# Patient Record
Sex: MALE | Race: WHITE | Employment: FULL TIME | URBAN - METROPOLITAN AREA
[De-identification: names, ages, dates, MRNs, and addresses within clinical notes are randomized per-mention and may not be internally consistent; named-entity substitution may affect disease eponyms.]

---

## 2024-11-14 ENCOUNTER — OFFICE VISIT (OUTPATIENT)
Facility: CLINIC | Age: 44
End: 2024-11-14

## 2024-11-14 VITALS
HEART RATE: 66 BPM | RESPIRATION RATE: 18 BRPM | BODY MASS INDEX: 24.64 KG/M2 | HEIGHT: 67 IN | OXYGEN SATURATION: 97 % | TEMPERATURE: 97.7 F | SYSTOLIC BLOOD PRESSURE: 133 MMHG | DIASTOLIC BLOOD PRESSURE: 85 MMHG | WEIGHT: 157 LBS

## 2024-11-14 DIAGNOSIS — Z00.00 HEALTHCARE MAINTENANCE: ICD-10-CM

## 2024-11-14 DIAGNOSIS — F10.11 HISTORY OF ALCOHOL ABUSE: ICD-10-CM

## 2024-11-14 DIAGNOSIS — F43.10 PTSD (POST-TRAUMATIC STRESS DISORDER): ICD-10-CM

## 2024-11-14 DIAGNOSIS — F41.9 ANXIETY: ICD-10-CM

## 2024-11-14 DIAGNOSIS — G47.00 INSOMNIA, UNSPECIFIED TYPE: ICD-10-CM

## 2024-11-14 DIAGNOSIS — F19.11 HX OF SUBSTANCE ABUSE (HCC): Primary | ICD-10-CM

## 2024-11-14 DIAGNOSIS — F33.1 MODERATE EPISODE OF RECURRENT MAJOR DEPRESSIVE DISORDER (HCC): ICD-10-CM

## 2024-11-14 DIAGNOSIS — L40.9 PSORIASIS: ICD-10-CM

## 2024-11-14 LAB
ANION GAP SERPL CALC-SCNC: 0 MMOL/L (ref 2–12)
BASOPHILS # BLD: 0.1 K/UL (ref 0–0.1)
BASOPHILS NFR BLD: 1 % (ref 0–1)
BUN SERPL-MCNC: 11 MG/DL (ref 6–20)
BUN/CREAT SERPL: 15 (ref 12–20)
CALCIUM SERPL-MCNC: 9.6 MG/DL (ref 8.5–10.1)
CHLORIDE SERPL-SCNC: 102 MMOL/L (ref 97–108)
CHOLEST SERPL-MCNC: 221 MG/DL
CO2 SERPL-SCNC: 35 MMOL/L (ref 21–32)
CREAT SERPL-MCNC: 0.75 MG/DL (ref 0.7–1.3)
DIFFERENTIAL METHOD BLD: ABNORMAL
EOSINOPHIL # BLD: 1 K/UL (ref 0–0.4)
EOSINOPHIL NFR BLD: 11 % (ref 0–7)
ERYTHROCYTE [DISTWIDTH] IN BLOOD BY AUTOMATED COUNT: 13.2 % (ref 11.5–14.5)
EST. AVERAGE GLUCOSE BLD GHB EST-MCNC: 103 MG/DL
GLUCOSE SERPL-MCNC: 92 MG/DL (ref 65–100)
HBA1C MFR BLD: 5.2 % (ref 4–5.6)
HCT VFR BLD AUTO: 40.9 % (ref 36.6–50.3)
HDLC SERPL-MCNC: 59 MG/DL
HDLC SERPL: 3.7 (ref 0–5)
HGB BLD-MCNC: 13.6 G/DL (ref 12.1–17)
IMM GRANULOCYTES # BLD AUTO: 0 K/UL (ref 0–0.04)
IMM GRANULOCYTES NFR BLD AUTO: 0 % (ref 0–0.5)
LDLC SERPL CALC-MCNC: 97.2 MG/DL (ref 0–100)
LYMPHOCYTES # BLD: 3.2 K/UL (ref 0.8–3.5)
LYMPHOCYTES NFR BLD: 35 % (ref 12–49)
MCH RBC QN AUTO: 30.6 PG (ref 26–34)
MCHC RBC AUTO-ENTMCNC: 33.3 G/DL (ref 30–36.5)
MCV RBC AUTO: 92.1 FL (ref 80–99)
MONOCYTES # BLD: 0.5 K/UL (ref 0–1)
MONOCYTES NFR BLD: 5 % (ref 5–13)
NEUTS SEG # BLD: 4.4 K/UL (ref 1.8–8)
NEUTS SEG NFR BLD: 48 % (ref 32–75)
NRBC # BLD: 0 K/UL (ref 0–0.01)
NRBC BLD-RTO: 0 PER 100 WBC
PLATELET # BLD AUTO: 223 K/UL (ref 150–400)
PMV BLD AUTO: 10.7 FL (ref 8.9–12.9)
POTASSIUM SERPL-SCNC: 4.3 MMOL/L (ref 3.5–5.1)
RBC # BLD AUTO: 4.44 M/UL (ref 4.1–5.7)
RBC MORPH BLD: ABNORMAL
SODIUM SERPL-SCNC: 137 MMOL/L (ref 136–145)
TRIGL SERPL-MCNC: 324 MG/DL
VLDLC SERPL CALC-MCNC: 64.8 MG/DL
WBC # BLD AUTO: 9.2 K/UL (ref 4.1–11.1)

## 2024-11-14 RX ORDER — ESCITALOPRAM OXALATE 20 MG/1
20 TABLET ORAL DAILY
Qty: 30 TABLET | Refills: 0 | Status: SHIPPED | OUTPATIENT
Start: 2024-11-14

## 2024-11-14 RX ORDER — HYDROXYZINE HYDROCHLORIDE 25 MG/1
25 TABLET, FILM COATED ORAL NIGHTLY
Qty: 30 TABLET | Refills: 0 | Status: SHIPPED | OUTPATIENT
Start: 2024-11-14 | End: 2024-12-14

## 2024-11-14 SDOH — ECONOMIC STABILITY: FOOD INSECURITY: WITHIN THE PAST 12 MONTHS, THE FOOD YOU BOUGHT JUST DIDN'T LAST AND YOU DIDN'T HAVE MONEY TO GET MORE.: NEVER TRUE

## 2024-11-14 SDOH — ECONOMIC STABILITY: INCOME INSECURITY: HOW HARD IS IT FOR YOU TO PAY FOR THE VERY BASICS LIKE FOOD, HOUSING, MEDICAL CARE, AND HEATING?: NOT HARD AT ALL

## 2024-11-14 SDOH — ECONOMIC STABILITY: FOOD INSECURITY: WITHIN THE PAST 12 MONTHS, YOU WORRIED THAT YOUR FOOD WOULD RUN OUT BEFORE YOU GOT MONEY TO BUY MORE.: NEVER TRUE

## 2024-11-14 ASSESSMENT — ANXIETY QUESTIONNAIRES
2. NOT BEING ABLE TO STOP OR CONTROL WORRYING: NEARLY EVERY DAY
5. BEING SO RESTLESS THAT IT IS HARD TO SIT STILL: NEARLY EVERY DAY
IF YOU CHECKED OFF ANY PROBLEMS ON THIS QUESTIONNAIRE, HOW DIFFICULT HAVE THESE PROBLEMS MADE IT FOR YOU TO DO YOUR WORK, TAKE CARE OF THINGS AT HOME, OR GET ALONG WITH OTHER PEOPLE: SOMEWHAT DIFFICULT
7. FEELING AFRAID AS IF SOMETHING AWFUL MIGHT HAPPEN: NOT AT ALL
3. WORRYING TOO MUCH ABOUT DIFFERENT THINGS: NEARLY EVERY DAY
4. TROUBLE RELAXING: NEARLY EVERY DAY
GAD7 TOTAL SCORE: 18
6. BECOMING EASILY ANNOYED OR IRRITABLE: NEARLY EVERY DAY
1. FEELING NERVOUS, ANXIOUS, OR ON EDGE: NEARLY EVERY DAY

## 2024-11-14 ASSESSMENT — PATIENT HEALTH QUESTIONNAIRE - PHQ9
7. TROUBLE CONCENTRATING ON THINGS, SUCH AS READING THE NEWSPAPER OR WATCHING TELEVISION: NOT AT ALL
10. IF YOU CHECKED OFF ANY PROBLEMS, HOW DIFFICULT HAVE THESE PROBLEMS MADE IT FOR YOU TO DO YOUR WORK, TAKE CARE OF THINGS AT HOME, OR GET ALONG WITH OTHER PEOPLE: SOMEWHAT DIFFICULT
SUM OF ALL RESPONSES TO PHQ QUESTIONS 1-9: 16
5. POOR APPETITE OR OVEREATING: MORE THAN HALF THE DAYS
4. FEELING TIRED OR HAVING LITTLE ENERGY: NEARLY EVERY DAY
6. FEELING BAD ABOUT YOURSELF - OR THAT YOU ARE A FAILURE OR HAVE LET YOURSELF OR YOUR FAMILY DOWN: NEARLY EVERY DAY
3. TROUBLE FALLING OR STAYING ASLEEP: NEARLY EVERY DAY
9. THOUGHTS THAT YOU WOULD BE BETTER OFF DEAD, OR OF HURTING YOURSELF: NOT AT ALL
2. FEELING DOWN, DEPRESSED OR HOPELESS: MORE THAN HALF THE DAYS
SUM OF ALL RESPONSES TO PHQ QUESTIONS 1-9: 16
SUM OF ALL RESPONSES TO PHQ QUESTIONS 1-9: 16
8. MOVING OR SPEAKING SO SLOWLY THAT OTHER PEOPLE COULD HAVE NOTICED. OR THE OPPOSITE, BEING SO FIGETY OR RESTLESS THAT YOU HAVE BEEN MOVING AROUND A LOT MORE THAN USUAL: MORE THAN HALF THE DAYS
1. LITTLE INTEREST OR PLEASURE IN DOING THINGS: SEVERAL DAYS
SUM OF ALL RESPONSES TO PHQ QUESTIONS 1-9: 16
SUM OF ALL RESPONSES TO PHQ9 QUESTIONS 1 & 2: 3

## 2024-11-14 ASSESSMENT — ENCOUNTER SYMPTOMS
GASTROINTESTINAL NEGATIVE: 1
EYES NEGATIVE: 1
ALLERGIC/IMMUNOLOGIC NEGATIVE: 1
RESPIRATORY NEGATIVE: 1

## 2024-11-14 NOTE — PROGRESS NOTES
Family Medicine Initial Office Visit  Patient: Mamadou Engel  1980, 44 y.o., male  Encounter Date: 2024      CHIEF COMPLAINT  Chief Complaint   Patient presents with    New Patient     Mamadou Engel is an 44 y.o. male who presents as a new patient to [facility] to establish care.   Concerns: really bad psoriasis (onset in [employer]). Took Stelera - caused septic after injections [employer] - not sure timeline.   2. Bipolar - his wife wants to ck (she works here in cardiology) Night terrors.       SUBJECTIVE  Mamadou Enegl is a very pleasant 44-year-old male who presents to establish care.    Has not seen primary care physician in a couple of years.    Medical history significant for depression, anxiety, PTSD, psoriasis, history of substance abuse, history of alcohol abuse    Social Hx:  with kids.  Denies current alcohol intake, denies smoking or illicit drugs.      Alcohol use disorder/substance abuse disorder  Was drinking 415 years, is sober since 9 months.  Was in [place] and rehab clinic.  Developed opioid dependence 20 years ago when he had a kidney stone.  Use to take Percocet.  Has overcome opioid dependence.      Anxiety/depression/PTSD/night terrors  Was [employer], is out since   Is suffering from PTSD and night terrors.  Was diagnosed with anxiety and depression many years ago.  Has seen 8-9 therapists over the last few years, had bad experience with most of them  Has last seen a therapist in [place] in 2023  Patient himself tells me that he was adopted, does not know much about his biologic parents.  His adoptive parents  back-to-back 4 years ago.  His godfather shot himself and 3 friends had committed suicide at the same time.  Patient himself endorses night terrors couple of times per week.  Has a hard time falling asleep and staying asleep, sleeps about 3 hours per night.  Feels very irritable, short tempered, feels bad when he yells with his

## 2024-11-14 NOTE — PATIENT INSTRUCTIONS
This is what we discussed today:    Complete blood work after 8 hours of fasting.  I have placed a referral for you to see an skin specialist for your psoriasis  I have placed referral for you to see a psychiatrist for further evaluation for bipolar disorder.  Please check in with your insurance for a list of therapists that they cover.  Try Hydroxyzine 1 pill at night for sleep. If ineffective take 2 pills at night time      Thank you for taking an active part in your health management!       The 24/7 [facility] Health Crisis line ([phone]) is available to assist with mental health emergencies occurring in the [facility]. If you, or someone you know, is having thoughts of suicide, feelings of hopelessness or experiencing any other psychiatric emergency, please call! They can assist in discussing ways to stay safe and/or can link individuals to outpatient treatment, crisis stabilization programs, or psychiatric hospitalization as needed.   All crisis calls are free. If you have a mental health emergency, please call [phone].   Regular client appointments and intakes for new clients are being conducted by telephone or through tele-health options. Individuals wishing to begin outpatient counseling services through [facility] can the Intake number at [phone].  -----------     Crisis Response and Stabilization Team (CReST) Referral Line [phone]  CReST is a resource for children ages 5 - 18 or adults ages 18 & older who are in crisis and need help avoiding psychiatric hospitalization.  With quick response and assistance connecting to on-going services, CReST works to reduce the cycles of crises and prevent the need for more intense care.     -----------     [facility] CRISIS & REFERRAL LINE - [phone]  REACH is a program to support individuals with developmental disabilities who are at risk of crisis due to challenging behavioral health needs which are negatively affecting their quality of life.

## 2024-11-14 NOTE — PROGRESS NOTES
Chief Complaint   Patient presents with    New Patient     Mamadou Engel is an 44 y.o. male who presents as a new patient to [facility] to establish care.   Concerns: really bad psoriasis (onset in [employer]). Took Stelera - caused septic after injections [employer] - not sure timeline.   2. Bipolar - his wife wants to ck (she works here in cardiology) Night terrors.     \"Have you been to the ER, urgent care clinic since your last visit?  Hospitalized since your last visit?\"    YES - When: approximately 1 months ago.  Where and Why: Urgent care sent to ER (see note) bad strep.    “Have you seen or consulted any other health care providers outside of [facility] since your last visit?”    NO            Click Here for Release of Records Request

## 2024-11-14 NOTE — ASSESSMENT & PLAN NOTE
Extensive psoriatic patches on torso, upper or lower extremities.  Tells me that he developed sepsis under Stelara treatment.  Will refer to dermatology    Orders:    Sheridan Howard MD, [place] ([facility])

## 2024-11-14 NOTE — ASSESSMENT & PLAN NOTE
On SSRI, Lexapro.  Will refer to psychiatrist.  Discussed at length importance of having therapist    Orders:    Tima Genao MD, Psychiatry, [place]

## 2024-11-15 LAB — TSH SERPL DL<=0.05 MIU/L-ACNC: 1 UIU/ML (ref 0.45–4.5)

## 2025-01-23 ENCOUNTER — OFFICE VISIT (OUTPATIENT)
Facility: CLINIC | Age: 45
End: 2025-01-23
Payer: COMMERCIAL

## 2025-01-23 VITALS
WEIGHT: 160.3 LBS | BODY MASS INDEX: 25.16 KG/M2 | TEMPERATURE: 98 F | DIASTOLIC BLOOD PRESSURE: 85 MMHG | HEIGHT: 67 IN | HEART RATE: 82 BPM | OXYGEN SATURATION: 95 % | SYSTOLIC BLOOD PRESSURE: 138 MMHG

## 2025-01-23 DIAGNOSIS — F33.1 MAJOR DEPRESSIVE DISORDER, RECURRENT, MODERATE (HCC): Primary | ICD-10-CM

## 2025-01-23 DIAGNOSIS — F41.9 ANXIETY: ICD-10-CM

## 2025-01-23 DIAGNOSIS — F10.11 HISTORY OF ALCOHOL ABUSE: ICD-10-CM

## 2025-01-23 PROBLEM — F19.11 HX OF SUBSTANCE ABUSE (HCC): Status: ACTIVE | Noted: 2025-01-23

## 2025-01-23 PROCEDURE — 99214 OFFICE O/P EST MOD 30 MIN: CPT

## 2025-01-23 RX ORDER — VENLAFAXINE HYDROCHLORIDE 37.5 MG/1
37.5 CAPSULE, EXTENDED RELEASE ORAL DAILY
Qty: 30 CAPSULE | Refills: 3 | Status: SHIPPED | OUTPATIENT
Start: 2025-01-23

## 2025-01-23 SDOH — ECONOMIC STABILITY: FOOD INSECURITY: WITHIN THE PAST 12 MONTHS, THE FOOD YOU BOUGHT JUST DIDN'T LAST AND YOU DIDN'T HAVE MONEY TO GET MORE.: NEVER TRUE

## 2025-01-23 SDOH — ECONOMIC STABILITY: FOOD INSECURITY: WITHIN THE PAST 12 MONTHS, YOU WORRIED THAT YOUR FOOD WOULD RUN OUT BEFORE YOU GOT MONEY TO BUY MORE.: NEVER TRUE

## 2025-01-23 ASSESSMENT — PATIENT HEALTH QUESTIONNAIRE - PHQ9
6. FEELING BAD ABOUT YOURSELF - OR THAT YOU ARE A FAILURE OR HAVE LET YOURSELF OR YOUR FAMILY DOWN: MORE THAN HALF THE DAYS
SUM OF ALL RESPONSES TO PHQ QUESTIONS 1-9: 10
10. IF YOU CHECKED OFF ANY PROBLEMS, HOW DIFFICULT HAVE THESE PROBLEMS MADE IT FOR YOU TO DO YOUR WORK, TAKE CARE OF THINGS AT HOME, OR GET ALONG WITH OTHER PEOPLE: SOMEWHAT DIFFICULT
1. LITTLE INTEREST OR PLEASURE IN DOING THINGS: SEVERAL DAYS
SUM OF ALL RESPONSES TO PHQ QUESTIONS 1-9: 10
SUM OF ALL RESPONSES TO PHQ9 QUESTIONS 1 & 2: 2
SUM OF ALL RESPONSES TO PHQ QUESTIONS 1-9: 10
2. FEELING DOWN, DEPRESSED OR HOPELESS: SEVERAL DAYS
3. TROUBLE FALLING OR STAYING ASLEEP: MORE THAN HALF THE DAYS
7. TROUBLE CONCENTRATING ON THINGS, SUCH AS READING THE NEWSPAPER OR WATCHING TELEVISION: NOT AT ALL
5. POOR APPETITE OR OVEREATING: NOT AT ALL
8. MOVING OR SPEAKING SO SLOWLY THAT OTHER PEOPLE COULD HAVE NOTICED. OR THE OPPOSITE, BEING SO FIGETY OR RESTLESS THAT YOU HAVE BEEN MOVING AROUND A LOT MORE THAN USUAL: SEVERAL DAYS
SUM OF ALL RESPONSES TO PHQ QUESTIONS 1-9: 10
9. THOUGHTS THAT YOU WOULD BE BETTER OFF DEAD, OR OF HURTING YOURSELF: NOT AT ALL
4. FEELING TIRED OR HAVING LITTLE ENERGY: NEARLY EVERY DAY

## 2025-01-23 NOTE — PROGRESS NOTES
Chief Complaint   Patient presents with    Anxiety     Mamadou Engel is a 44 y.o. male who presents for a follow up on anxiety and PTSD.    Patient has been on this dose since May. He reports he feels like this medication is not effective anymore.     \"Have you been to the ER, urgent care clinic since your last visit?  Hospitalized since your last visit?\"    NO    “Have you seen or consulted any other health care providers outside of [facility] since your last visit?”    NO            Click Here for Release of Records Request    
He asked for psychiatrists in [place] and I have printed off a list for them. Will proceed with lexapro wash out over 2-3 weeks, then start effexro 37.5 mg qd to see if there is any improvement in mood. Follow up in 4 weeks.    Orders:    venlafaxine (EFFEXOR XR) 37.5 MG extended release capsule; Take 1 capsule by mouth daily    Anxiety   Chronic, not at goal (unstable). Has been on Prozac > Zoloft > Lexapro 20 mg. Zoloft and lexapro helped with mood/anxiety for a bit (4-5 months) but then stopped working. Hydroxyzine prn made him feel too uptight per patient. Will wean off lexapro and start effexor as scheduled above. Follow up in 4 weeks.    Orders:    venlafaxine (EFFEXOR XR) 37.5 MG extended release capsule; Take 1 capsule by mouth daily    History of alcohol abuse   Chronic, not at goal (unstable), patient has relapse in drinking alcohol to help with his mood symptoms and anxiety. Today, we introduced naltrexone as a treatment option. But it seems he is using more alcohol to help with mood than cravings. With weaning off lexapro and starting effexor we will hold off on starting naltrexone in case there are overlying side effects. CMP from October 2024 checked. Will treat depression and anxiety first and patient has AA programs that he has been a part of in the past.                         Portions of this note may have been populated using smart dictation software and may have \"sounds-like\" errors present.     Pt was counseled on risks, benefits and alternatives of treatment options. All questions were asked and answered and the patient was agreeable with the treatment plan as outlined.

## 2025-01-23 NOTE — ASSESSMENT & PLAN NOTE
Chronic, not at goal (unstable). Has been on Prozac > Zoloft > Lexapro 20 mg. Zoloft and lexapro helped with mood for a bit (4-5 months) but then stopped working. He is not in counseling. Reports he has bad experiences with counselors and psychiatry. Since he has failed SSRIs, we've discussed switching him to an SNRI. Although we've also discussed if there is underlying bipolar, he would have to see psychiatry to get started on a mood stabilizer. Patient agreed and will thinking about psychiatry. He asked for psychiatrists in [place] and I have printed off a list for them. Will proceed with lexapro wash out over 2-3 weeks, then start effexro 37.5 mg qd to see if there is any improvement in mood. Follow up in 4 weeks.    Orders:    venlafaxine (EFFEXOR XR) 37.5 MG extended release capsule; Take 1 capsule by mouth daily

## 2025-02-10 ENCOUNTER — TELEPHONE (OUTPATIENT)
Facility: CLINIC | Age: 45
End: 2025-02-10

## 2025-02-10 NOTE — TELEPHONE ENCOUNTER
Pt's wife called in regards to pt's Effexor RX.     Pt is having vivid dreams, fearful to go to sleep.   Pt states he feels SOB, however, wife states he is not.     Pt is off on Lexapro and has been on Effexor for 2 weeks.

## 2025-02-13 ENCOUNTER — CLINICAL DOCUMENTATION (OUTPATIENT)
Facility: CLINIC | Age: 45
End: 2025-02-13

## 2025-02-13 NOTE — TELEPHONE ENCOUNTER
Jennifer Bashir, DO  Mamadou Canoy21 hours ago (2:14 PM)       I've received a message from our staff that you are completely off Lexapro now.      But since being effexor for 2 weeks you have had very bad side effects. I would advise you to stop the Effexor since it has been 2 weeks and you can getting more side effects then benefit from it. We're going to give you a call to check on you, but we have follow up 2/25. Let's stop effexor entirely to reassess your symptoms then.     Dr. Bashir

## 2025-02-14 ENCOUNTER — TELEPHONE (OUTPATIENT)
Facility: CLINIC | Age: 45
End: 2025-02-14

## 2025-02-14 NOTE — TELEPHONE ENCOUNTER
Pts wife is calling about the message that was sent. He has not been able to get into my chart, so I gave her the message but she would like a call back as he is at work. She is on his HIPAA     Amy [phone]

## 2025-02-14 NOTE — TELEPHONE ENCOUNTER
Amy Esme (wife) works upstairs  Advised that pt is not taking effexor anymore but he is still having extreme anxiety symptoms

## 2025-02-19 ENCOUNTER — TELEMEDICINE (OUTPATIENT)
Facility: CLINIC | Age: 45
End: 2025-02-19
Payer: COMMERCIAL

## 2025-02-19 DIAGNOSIS — F43.10 PTSD (POST-TRAUMATIC STRESS DISORDER): ICD-10-CM

## 2025-02-19 DIAGNOSIS — F41.9 ANXIETY: Primary | ICD-10-CM

## 2025-02-19 PROBLEM — F19.11 HX OF SUBSTANCE ABUSE (HCC): Status: RESOLVED | Noted: 2025-01-23 | Resolved: 2025-02-19

## 2025-02-19 PROCEDURE — 99214 OFFICE O/P EST MOD 30 MIN: CPT

## 2025-02-19 RX ORDER — BUSPIRONE HYDROCHLORIDE 5 MG/1
5 TABLET ORAL 2 TIMES DAILY
Qty: 60 TABLET | Refills: 0 | Status: SHIPPED | OUTPATIENT
Start: 2025-02-19 | End: 2025-03-21

## 2025-02-19 NOTE — ASSESSMENT & PLAN NOTE
Chronic, unstable.  SSRIs have worked for a bit, but then stopped working.  Has been on Lexapro, Prozac, Zoloft.  Hydroxyzine made him uptight.  Effexor gave him vivid dreams.  Is currently not on any medications right now.  Will trial BuSpar 5 mg twice daily.  Follow-up in 4 weeks.  Let me know right away if he has any bad side effects we can discontinue the medication and consider another medication.  Does not want to follow with a counselor or therapist as of now    Orders:    busPIRone (BUSPAR) 5 MG tablet; Take 1 tablet by mouth 2 times daily

## 2025-02-19 NOTE — PROGRESS NOTES
Chief Complaint   Patient presents with    Anxiety     Follow up- Effexor is not working well for pt, triggering his PTSD      \"Have you been to the ER, urgent care clinic since your last visit?  Hospitalized since your last visit?\"    NO    “Have you seen or consulted any other health care providers outside of [facility] since your last visit?”    NO            Click Here for Release of Records Request

## 2025-02-19 NOTE — PROGRESS NOTES
Mamadou Engel, was evaluated through a synchronous (real-time) audio-video encounter. The patient (or guardian if applicable) is aware that this is a billable service, which includes applicable co-pays. This Virtual Visit was conducted with patient's (and/or legal guardian's) consent. Patient identification was verified, and a caregiver was present when appropriate.   The patient was located at Home: [id number] [facility] [id number]  Provider was located at Home (Appt Dept State[place]  Confirm you are appropriately licensed, registered, or certified to deliver care in the state where the patient is located as indicated above. If you are not or unsure, please re-schedule the visit: Yes, I confirm.     Mamadou Engel (:  1980) is a Established patient, presenting virtually for evaluation of the following:      Below is the assessment and plan developed based on review of pertinent history, physical exam, labs, studies, and medications.     Assessment & Plan  Anxiety  Chronic, unstable.  SSRIs have worked for a bit, but then stopped working.  Has been on Lexapro, Prozac, Zoloft.  Hydroxyzine made him uptight.  Effexor gave him vivid dreams.  Is currently not on any medications right now.  Will trial BuSpar 5 mg twice daily.  Follow-up in 4 weeks.  Let me know right away if he has any bad side effects we can discontinue the medication and consider another medication.  Does not want to follow with a counselor or therapist as of now.    Orders:    busPIRone (BUSPAR) 5 MG tablet; Take 1 tablet by mouth 2 times daily    PTSD (post-traumatic stress disorder)   Chronic, unstable.  SSRIs have worked for a bit, but then stopped working.  Has been on Lexapro, Prozac, Zoloft.  Hydroxyzine made him uptight.  Effexor gave him vivid dreams.  Is currently not on any medications right now.  Will trial BuSpar 5 mg twice daily.  Follow-up in 4 weeks.  Let me know right away if he has any bad side effects we can

## 2025-03-13 RX ORDER — BUSPIRONE HYDROCHLORIDE 7.5 MG/1
7.5 TABLET ORAL 2 TIMES DAILY
Qty: 60 TABLET | Refills: 0 | Status: SHIPPED | OUTPATIENT
Start: 2025-03-13 | End: 2025-04-12

## 2025-03-28 ENCOUNTER — TELEPHONE (OUTPATIENT)
Facility: CLINIC | Age: 45
End: 2025-03-28

## 2025-03-28 NOTE — TELEPHONE ENCOUNTER
Salma Nixon MD [facility] Clinical Staff  Pls contact patient and pass on info below. Thanks          Previous Messages       ----- Message -----  From: Jennifer Bashir DO  Sent: 3/13/2025  To: Mamadou Engel  Subject: Unread Message Notification      This message is to inform you that the patient has not yet read the following message. (Notification date: March 27, 2025)      Medication update    From  Jennifer Bashir DO To  Mamadou Engel Sent and Delivered  3/13/2025 10:52 AM         I sent in a new prescription of BuSpar 7.5 mg to take twice a day.  Lets make sure you have a follow-up virtually so that we can document this increase in dose and if you are experiencing any side effects on it.  I noticed that we had 1 on 2/25/2025 that was canceled, I will message my  to get this set up.

## 2025-03-28 NOTE — TELEPHONE ENCOUNTER
Called pt, and left a voice message, that he/she is due for their follow up appointment, here at [facility]. Advised pt to call the office back to schedule their appointment.

## 2025-04-16 ENCOUNTER — PATIENT MESSAGE (OUTPATIENT)
Facility: CLINIC | Age: 45
End: 2025-04-16

## 2025-04-17 RX ORDER — BUSPIRONE HYDROCHLORIDE 7.5 MG/1
7.5 TABLET ORAL 2 TIMES DAILY
Qty: 60 TABLET | Refills: 0 | Status: SHIPPED | OUTPATIENT
Start: 2025-04-17 | End: 2025-05-17

## 2025-05-18 ENCOUNTER — NURSE TRIAGE (OUTPATIENT)
Dept: OTHER | Facility: CLINIC | Age: 45
End: 2025-05-18

## 2025-05-18 ENCOUNTER — HOSPITAL ENCOUNTER (EMERGENCY)
Facility: HOSPITAL | Age: 45
Discharge: HOME | End: 2025-05-18
Attending: EMERGENCY MEDICINE
Payer: COMMERCIAL

## 2025-05-18 VITALS
HEART RATE: 97 BPM | SYSTOLIC BLOOD PRESSURE: 146 MMHG | WEIGHT: 155 LBS | DIASTOLIC BLOOD PRESSURE: 105 MMHG | TEMPERATURE: 97.5 F | RESPIRATION RATE: 18 BRPM | HEIGHT: 67 IN | OXYGEN SATURATION: 99 % | BODY MASS INDEX: 24.33 KG/M2

## 2025-05-18 DIAGNOSIS — K59.00 CONSTIPATION, UNSPECIFIED CONSTIPATION TYPE: ICD-10-CM

## 2025-05-18 DIAGNOSIS — K62.5 RECTAL BLEEDING: Primary | ICD-10-CM

## 2025-05-18 DIAGNOSIS — K64.8 INTERNAL HEMORRHOIDS: ICD-10-CM

## 2025-05-18 LAB
ALBUMIN SERPL-MCNC: 4 G/DL (ref 3.5–5)
ALBUMIN/GLOB SERPL: 1.1 (ref 1.1–2.2)
ALP SERPL-CCNC: 86 U/L (ref 45–117)
ALT SERPL-CCNC: 31 U/L (ref 12–78)
ANION GAP SERPL CALC-SCNC: 6 MMOL/L (ref 2–12)
AST SERPL-CCNC: 23 U/L (ref 15–37)
BASOPHILS # BLD: 0.08 K/UL (ref 0–0.1)
BASOPHILS NFR BLD: 0.7 % (ref 0–1)
BILIRUB SERPL-MCNC: 0.8 MG/DL (ref 0.2–1)
BUN SERPL-MCNC: 13 MG/DL (ref 6–20)
BUN/CREAT SERPL: 17 (ref 12–20)
CALCIUM SERPL-MCNC: 9.2 MG/DL (ref 8.5–10.1)
CHLORIDE SERPL-SCNC: 107 MMOL/L (ref 97–108)
CO2 SERPL-SCNC: 25 MMOL/L (ref 21–32)
CREAT SERPL-MCNC: 0.75 MG/DL (ref 0.7–1.3)
DIFFERENTIAL METHOD BLD: ABNORMAL
EOSINOPHIL # BLD: 0.96 K/UL (ref 0–0.4)
EOSINOPHIL NFR BLD: 8 % (ref 0–7)
ERYTHROCYTE [DISTWIDTH] IN BLOOD BY AUTOMATED COUNT: 12.9 % (ref 11.5–14.5)
GLOBULIN SER CALC-MCNC: 3.6 G/DL (ref 2–4)
GLUCOSE SERPL-MCNC: 81 MG/DL (ref 65–100)
HCT VFR BLD AUTO: 42.3 % (ref 36.6–50.3)
HGB BLD-MCNC: 14.4 G/DL (ref 12.1–17)
IMM GRANULOCYTES # BLD AUTO: 0.04 K/UL (ref 0–0.04)
IMM GRANULOCYTES NFR BLD AUTO: 0.3 % (ref 0–0.5)
LYMPHOCYTES # BLD: 2.2 K/UL (ref 0.8–3.5)
LYMPHOCYTES NFR BLD: 18.4 % (ref 12–49)
MCH RBC QN AUTO: 31.6 PG (ref 26–34)
MCHC RBC AUTO-ENTMCNC: 34 G/DL (ref 30–36.5)
MCV RBC AUTO: 92.8 FL (ref 80–99)
MONOCYTES # BLD: 0.79 K/UL (ref 0–1)
MONOCYTES NFR BLD: 6.6 % (ref 5–13)
NEUTS SEG # BLD: 7.91 K/UL (ref 1.8–8)
NEUTS SEG NFR BLD: 66 % (ref 32–75)
NRBC # BLD: 0 K/UL (ref 0–0.01)
NRBC BLD-RTO: 0 PER 100 WBC
PLATELET # BLD AUTO: 200 K/UL (ref 150–400)
PMV BLD AUTO: 9.8 FL (ref 8.9–12.9)
POTASSIUM SERPL-SCNC: 3.7 MMOL/L (ref 3.5–5.1)
PROT SERPL-MCNC: 7.6 G/DL (ref 6.4–8.2)
RBC # BLD AUTO: 4.56 M/UL (ref 4.1–5.7)
SODIUM SERPL-SCNC: 138 MMOL/L (ref 136–145)
WBC # BLD AUTO: 12 K/UL (ref 4.1–11.1)

## 2025-05-18 PROCEDURE — 96374 THER/PROPH/DIAG INJ IV PUSH: CPT

## 2025-05-18 PROCEDURE — 6360000002 HC RX W HCPCS: Performed by: EMERGENCY MEDICINE

## 2025-05-18 PROCEDURE — 94761 N-INVAS EAR/PLS OXIMETRY MLT: CPT

## 2025-05-18 PROCEDURE — 99284 EMERGENCY DEPT VISIT MOD MDM: CPT

## 2025-05-18 PROCEDURE — 80053 COMPREHEN METABOLIC PANEL: CPT

## 2025-05-18 PROCEDURE — 96375 TX/PRO/DX INJ NEW DRUG ADDON: CPT

## 2025-05-18 PROCEDURE — 36415 COLL VENOUS BLD VENIPUNCTURE: CPT

## 2025-05-18 PROCEDURE — 85025 COMPLETE CBC W/AUTO DIFF WBC: CPT

## 2025-05-18 RX ORDER — OXYCODONE AND ACETAMINOPHEN 5; 325 MG/1; MG/1
1 TABLET ORAL EVERY 6 HOURS PRN
Qty: 10 TABLET | Refills: 0 | Status: SHIPPED | OUTPATIENT
Start: 2025-05-18 | End: 2025-05-21

## 2025-05-18 RX ORDER — LIDOCAINE HYDROCHLORIDE 20 MG/ML
JELLY TOPICAL PRN
Status: DISCONTINUED | OUTPATIENT
Start: 2025-05-18 | End: 2025-05-18 | Stop reason: HOSPADM

## 2025-05-18 RX ORDER — KETOROLAC TROMETHAMINE 15 MG/ML
15 INJECTION, SOLUTION INTRAMUSCULAR; INTRAVENOUS
Status: COMPLETED | OUTPATIENT
Start: 2025-05-18 | End: 2025-05-18

## 2025-05-18 RX ORDER — KETOROLAC TROMETHAMINE 10 MG/1
10 TABLET, FILM COATED ORAL EVERY 6 HOURS PRN
Qty: 20 TABLET | Refills: 0 | Status: SHIPPED | OUTPATIENT
Start: 2025-05-18

## 2025-05-18 RX ORDER — PRAMOXINE HYDROCHLORIDE HYDROCORTISONE ACETATE 100; 100 MG/10G; MG/10G
AEROSOL, FOAM TOPICAL
Qty: 1 EACH | Refills: 0 | Status: SHIPPED
Start: 2025-05-18 | End: 2025-05-18

## 2025-05-18 RX ORDER — POLYETHYLENE GLYCOL 3350 17 G/17G
17 POWDER, FOR SOLUTION ORAL DAILY
Status: DISCONTINUED | OUTPATIENT
Start: 2025-05-18 | End: 2025-05-18

## 2025-05-18 RX ORDER — PRAMOXINE HYDROCHLORIDE HYDROCORTISONE ACETATE 100; 100 MG/10G; MG/10G
AEROSOL, FOAM TOPICAL
Qty: 1 EACH | Refills: 0 | Status: SHIPPED | OUTPATIENT
Start: 2025-05-18

## 2025-05-18 RX ORDER — MORPHINE SULFATE 4 MG/ML
4 INJECTION, SOLUTION INTRAMUSCULAR; INTRAVENOUS
Refills: 0 | Status: COMPLETED | OUTPATIENT
Start: 2025-05-18 | End: 2025-05-18

## 2025-05-18 RX ORDER — POLYETHYLENE GLYCOL 3350 17 G/17G
17 POWDER, FOR SOLUTION ORAL DAILY
Qty: 116 G | Refills: 0 | Status: SHIPPED | OUTPATIENT
Start: 2025-05-18 | End: 2025-05-25

## 2025-05-18 RX ADMIN — KETOROLAC TROMETHAMINE 15 MG: 15 INJECTION, SOLUTION INTRAMUSCULAR; INTRAVENOUS at 13:02

## 2025-05-18 RX ADMIN — MORPHINE SULFATE 4 MG: 4 INJECTION INTRAVENOUS at 13:51

## 2025-05-18 ASSESSMENT — PAIN SCALES - GENERAL: PAINLEVEL_OUTOF10: 10

## 2025-05-18 ASSESSMENT — PAIN - FUNCTIONAL ASSESSMENT: PAIN_FUNCTIONAL_ASSESSMENT: 0-10

## 2025-05-18 NOTE — ED TRIAGE NOTES
Pt arrives to ED via POV with c/o internal hemorrhoid pain and started bleeding 3 days ago     Ibuprofen 800 mg taken at 1200 PTA

## 2025-05-18 NOTE — ED PROVIDER NOTES
[facility] EMERGENCY DEPARTMENT  EMERGENCY DEPARTMENT ENCOUNTER      Pt Name: Mamadou Engel  MRN: [id number]  Birthdate 1980  Date of evaluation: 5/18/2025  Provider: Bethany Ventura DO    CHIEF COMPLAINT       Chief Complaint   Patient presents with    Hemorrhoids         HISTORY OF PRESENT ILLNESS   (Location/Symptom, Timing/Onset, Context/Setting, Quality, Duration, Modifying Factors, Severity)  Note limiting factors.   HPI    Patient is a 45-year-old male who presents emergency department with rectal bleeding and rectal pain over the past 3 days.    Review of External Medical Records:     Nursing Notes were reviewed.    REVIEW OF SYSTEMS    (2-9 systems for level 4, 10 or more for level 5)     Review of Systems    Except as noted above the remainder of the review of systems was reviewed and negative.       PAST MEDICAL HISTORY     Past Medical History:   Diagnosis Date    Anxiety     OCD (obsessive compulsive disorder)     PTSD (post-traumatic stress disorder)     from surgery         SURGICAL HISTORY       Past Surgical History:   Procedure Laterality Date    EYE SURGERY      SPINAL FUSION Left 2023         CURRENT MEDICATIONS       Previous Medications    No medications on file       ALLERGIES     Promethazine    FAMILY HISTORY       Family History   Adopted: Yes   Problem Relation Age of Onset    Cancer Father         bile duct          SOCIAL HISTORY       Social History     Socioeconomic History    Marital status:    Tobacco Use    Smoking status: Every Day     Current packs/day: 0.25     Average packs/day: 0.3 packs/day for 28.4 years (7.1 ttl pk-yrs)     Types: Cigarettes     Start date: 1997    Smokeless tobacco: Never   Vaping Use    Vaping status: Never Used   Substance and Sexual Activity    Alcohol use: Yes     Comment: drinking for 2-3 months now    Drug use: Yes     Frequency: 4.0 times per week     Comment: THC and CBD    Sexual activity: Yes     Partners: Female

## 2025-05-19 ENCOUNTER — CARE COORDINATION (OUTPATIENT)
Dept: OTHER | Facility: CLINIC | Age: 45
End: 2025-05-19

## 2025-05-19 NOTE — CARE COORDINATION
Ambulatory Care Coordination Note     5/19/2025 3:28 PM     Patient outreach attempt by this AC today to offer care management services. AC was unable to reach the patient by telephone today;   left voice message requesting a return phone call to this ACM.     ACM: MATHEW CABRERA RN     Care Summary Note: Telephonic outreach attempt to the patient to offer care management services. Left a discreet VM with a request for a return call. Will continue outreach attempts.     PCP/Specialist follow up:       Follow Up:   Plan for next ACM outreach in approximately 1-2 days  to complete:  - outreach attempt to offer care management services.

## 2025-05-21 ENCOUNTER — CARE COORDINATION (OUTPATIENT)
Dept: OTHER | Facility: CLINIC | Age: 45
End: 2025-05-21

## 2025-05-21 NOTE — CARE COORDINATION
Ambulatory Care Coordination Note     5/21/2025 11:12 AM     Patient outreach attempt by this AC today to offer care management services. [facility] was unable to reach the patient by telephone today;   left voice message requesting a return phone call to this ACM.  letter mailed requesting patient  to contact this AC.      ACM: MATHEW CABRERA RN     Care Summary Note: Telephonic outreach to the patient to offer care management services. This was the 2nd outreach attempt. Left a discreet VM with a request for a return call. Will send an UTR letter and continue outreach attempts.     PCP/Specialist follow up:   Future Appointments         Provider Specialty Dept Phone    5/27/2025 9:20 AM Jennifer Bashir DO Family Medicine [phone]            Follow Up:   Plan for next [facility] outreach in approximately 2 weeks to complete:  - outreach attempt to offer care management services.

## 2025-06-04 ENCOUNTER — CARE COORDINATION (OUTPATIENT)
Dept: OTHER | Facility: CLINIC | Age: 45
End: 2025-06-04

## 2025-06-04 NOTE — CARE COORDINATION
Ambulatory Care Coordination Note     6/4/2025 11:06 AM     patient outreach attempt by this ACM today to offer care management services. ACM was unable to reach the patient by telephone today;   left voice message requesting a return phone call to this ACM.     Patient closed (unable to reach patient) from the High Risk Care Management program on 6/4/2025.  No further Ambulatory Care Manager follow up scheduled.